# Patient Record
Sex: FEMALE | Race: WHITE | NOT HISPANIC OR LATINO | Employment: STUDENT | URBAN - METROPOLITAN AREA
[De-identification: names, ages, dates, MRNs, and addresses within clinical notes are randomized per-mention and may not be internally consistent; named-entity substitution may affect disease eponyms.]

---
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
## 2023-07-03 ENCOUNTER — TELEPHONE (OUTPATIENT)
Dept: FAMILY MEDICINE CLINIC | Facility: CLINIC | Age: 18
End: 2023-07-03

## 2025-04-07 ENCOUNTER — OFFICE VISIT (OUTPATIENT)
Dept: FAMILY MEDICINE CLINIC | Facility: CLINIC | Age: 20
End: 2025-04-07
Payer: COMMERCIAL

## 2025-04-07 VITALS
HEART RATE: 90 BPM | DIASTOLIC BLOOD PRESSURE: 70 MMHG | BODY MASS INDEX: 27.8 KG/M2 | WEIGHT: 173 LBS | SYSTOLIC BLOOD PRESSURE: 140 MMHG | HEIGHT: 66 IN | TEMPERATURE: 97.7 F | OXYGEN SATURATION: 100 %

## 2025-04-07 DIAGNOSIS — L05.91 PILONIDAL CYST: Primary | ICD-10-CM

## 2025-04-07 PROCEDURE — 99213 OFFICE O/P EST LOW 20 MIN: CPT | Performed by: NURSE PRACTITIONER

## 2025-04-07 RX ORDER — CLINDAMYCIN HYDROCHLORIDE 150 MG/1
150 CAPSULE ORAL 2 TIMES DAILY
COMMUNITY
Start: 2025-04-05

## 2025-04-07 NOTE — PROGRESS NOTES
"Chief Complaint  Abscess (On buttocks - seen at [facility] ER 04/05/25)    Subjective        Juana Soto presents to [facility]  Abscess    Patient is here to discuss follow up after ER visit and pilonidal cyst drainage. She went to the ER due to pain and discomfort. They told her she had two lesions, one they drained, but told her the other was too 'hard'. She was given lidocaine patches, but she can't use now that there is an incision. She is still taking oral clindamycin q12 hours and still has most of the prescription left to take. The area is still pretty painful, no as painful as it was.  She is taking tylenol for the pain as well as applying heating pad. She picked up the pain patches but was afraid to put on the area due to the incision. She denies having this issue prior to now, but does have a family history of pilonidal cyst.     Objective   Vital Signs:  /70 (BP Location: Left arm, Patient Position: Sitting, Cuff Size: Adult)   Pulse 90   Temp 97.7 °F (36.5 °C) (Temporal)   Ht 167.6 cm (66\")   Wt 78.5 kg (173 lb)   SpO2 100%   BMI 27.92 kg/m²   Estimated body mass index is 27.92 kg/m² as calculated from the following:    Height as of this encounter: 167.6 cm (66\").    Weight as of this encounter: 78.5 kg (173 lb).    BMI is >= 25 and <30. (Overweight) The following options were offered after discussion;: weight loss educational material (shared in after visit summary) and Information on healthy weight added to patient's after visit summary.      Physical Exam  Constitutional:       Appearance: Normal appearance.   HENT:      Head: Normocephalic.   Eyes:      Extraocular Movements: Extraocular movements intact.      Pupils: Pupils are equal, round, and reactive to light.   Pulmonary:      Effort: Pulmonary effort is normal.   Musculoskeletal:         General: Normal range of motion.      Cervical back: Normal range of motion.   Skin:     General: Skin is warm " and dry.             Comments: Firmness palpated on both sides of upper gluteal cleft, small scab appearing lesion on left gluteal cleft. No redness, erythema, or streaking noted.   Neurological:      General: No focal deficit present.      Mental Status: She is alert and oriented to person, place, and time.   Psychiatric:         Mood and Affect: Mood normal.        Result Review :                Assessment and Plan   Diagnoses and all orders for this visit:    1. Pilonidal cyst (Primary)  -     Ambulatory Referral to General Surgery; Future             Follow Up   Return if symptoms worsen or fail to improve.  Patient was given instructions and counseling regarding her condition or for health maintenance advice. Please see specific information pulled into the AVS if appropriate.

## 2025-04-11 ENCOUNTER — OFFICE VISIT (OUTPATIENT)
Dept: SURGERY | Facility: CLINIC | Age: 20
End: 2025-04-11
Payer: COMMERCIAL

## 2025-04-11 VITALS
WEIGHT: 176.2 LBS | BODY MASS INDEX: 28.32 KG/M2 | SYSTOLIC BLOOD PRESSURE: 124 MMHG | TEMPERATURE: 98 F | DIASTOLIC BLOOD PRESSURE: 76 MMHG | HEIGHT: 66 IN | HEART RATE: 71 BPM | OXYGEN SATURATION: 100 %

## 2025-04-11 DIAGNOSIS — L05.91 PILONIDAL CYST: ICD-10-CM

## 2025-04-11 NOTE — PROGRESS NOTES
General Surgery Office Visit    Referring Provider:   ANALIA Ceballos    Chief Complaint:    Pilonidal abscess    History of Present Illness:    Juana Soto is a 20 y.o. female who presents for evaluation of pilonidal abscesses.  She has a strong family history of pilonidal disease.  She herself has never experienced any symptoms until 1 week ago.  She had 2 areas of pain and swelling in her bilateral superior gluteal cleft which were assessed in the emergency department at [facility] on 4/5/2025.  She underwent incision and drainage of one of the lesions with blood and pus drained; the other lesion was deemed indurated and drainable.  She was placed on clindamycin and recommended to follow-up with general surgery.  She states that since she started taking the clindamycin the two lesions have gone down in size significantly. She reports her pain has improved a lot. She is still sore when she lies on her back but this is getting better. She denies fevers or chills.    Past Medical History:   Anal fissure with associated bleeding  GERD  Syphilis     Past Surgical History:    Incision and drainage of pilonidal abscess 4/5/25 at [facility] ER  Colonoscopy 11/2/22 by Dr. Reinoso- recommended recall at age 43  Botox sphincterotomy for anal fissure 11/2/22 by Dr. Reinoso  Laceration repair of labia majora 1/20/20    Family History:    Father, paternal uncle, paternal grandfather all had pilonidal disease  No family history of colorectal cancer or IBD    Social History:    Vapes daily, denies cigarette use  Drinks occasionally  Denies recreational drug use  Boyfriend accompanies her to her appointment    Allergies:   No Known Allergies    Medications:     Current Outpatient Medications:     clindamycin (CLEOCIN) 150 MG capsule, Take 1 capsule by mouth 2 (Two) Times a Day., Disp: , Rfl:     Etonogestrel (NEXPLANON) 68 MG implant subdermal implant, To be inserted one time by prescriber. Route Subdermal., Disp: , Rfl:      "ibuprofen (ADVIL,MOTRIN) 600 MG tablet, Take 1 tablet by mouth 3 (Three) Times a Day., Disp: 21 tablet, Rfl: 0    Review of Systems:   Constitutional: denies fevers, chills  Skin: denies rash; + pilonidal abscesses    Physical Exam:   /76 (BP Location: Left arm, Patient Position: Sitting, Cuff Size: Adult)   Pulse 71   Temp 98 °F (36.7 °C) (Oral)   Ht 167.6 cm (66\")   Wt 79.9 kg (176 lb 3.2 oz)   SpO2 100%   BMI 28.44 kg/m²   Constitutional: Well-developed, well-nourished  Respiratory: Normal inspiratory effort on room air  Gastrointestinal: Abd nondistended  Musculoskeletal: Symmetric strength  Psychiatric: Normal mood and affect  Skin:  Warm, dry, no rash on visualized skin surfaces; in the superior gluteal cleft there are two palpable 1cm round areas of induration which are minimally tender without erythema or fluctuance; between these is a healing incision with dry flaky skin with no expressible drainage; about 1cm inferior to this in the midline is a tiny pilonidal pit without drainage or underlying palpable cyst    Assessment/Plan:  Pilonidal disease  Nicotine use    Patient is a 20 year old female presenting following incision and drainage of pilonidal abscess performed at [facility] ER 6 days ago. She was started on oral clindamycin and has had near-resolution of her acutely inflamed lesions. She has no drainable lesions noted on exam today and appears to be improving on her course of antibiotics.    I discussed with the patient and her boyfriend the etiology of pilonidal disease including its presumed chronic inflammatory nature involving infected hair follicles.  I discussed risk factors and the spectrum of disease from asymptomatic to chronically symptomatic.  We talked about options for treatment including expectant management with hair removal and monitoring for infection which would require incision and drainage and possibly antibiotics, minimally invasive excision as with Gips procedure, " pilonidal cystectomy, and for more complicated cases excision with flap procedures for wound coverage.  We discussed the benefits of surgery being removal of the entire hair follicle and prevention of recurrent cyst.  I recommended continued observation since this is the patient's first episode of pilonidal disease and she appears to be recovering well following treatment of her acute symptomatic lesions. I explained that she should continue her antibiotics, achieve pain control with tylenol/ibuprofen, warm and cool compresses, and discussed with her recommendations for hair removal to decrease risk of recurrence. I counseled her on signs and symptoms concerning for recurrence and need to call the office or return to the ER. Patient demonstrates understanding and is agreeable with the recommendations.    Madelyn Jimenez M.D.  General, Robotic, and Endoscopic Surgery  [facility]    [address], [address]  [place], [place], [address]  P: [phone]  F: [phone]

## (undated) DEVICE — TUBING, SUCTION, 1/4" X 10', STRAIGHT: Brand: MEDLINE

## (undated) DEVICE — SENSR O2 OXIMAX FNGR A/ 18IN NONSTR

## (undated) DEVICE — LN SMPL CO2 SHTRM SD STREAM W/M LUER

## (undated) DEVICE — CANN O2 ETCO2 FITS ALL CONN CO2 SMPL A/ 7IN DISP LF

## (undated) DEVICE — KT ORCA ORCAPOD DISP STRL

## (undated) DEVICE — ADAPT CLN BIOGUARD AIR/H2O DISP